# Patient Record
Sex: FEMALE | NOT HISPANIC OR LATINO | URBAN - METROPOLITAN AREA
[De-identification: names, ages, dates, MRNs, and addresses within clinical notes are randomized per-mention and may not be internally consistent; named-entity substitution may affect disease eponyms.]

---

## 2021-06-01 ENCOUNTER — WEB ENCOUNTER (OUTPATIENT)
Dept: URBAN - METROPOLITAN AREA CLINIC 94 | Facility: CLINIC | Age: 76
End: 2021-06-01

## 2021-06-01 ENCOUNTER — OFFICE VISIT (OUTPATIENT)
Dept: URBAN - METROPOLITAN AREA CLINIC 94 | Facility: CLINIC | Age: 76
End: 2021-06-01
Payer: MEDICARE

## 2021-06-01 VITALS
HEIGHT: 66 IN | HEART RATE: 159 BPM | DIASTOLIC BLOOD PRESSURE: 98 MMHG | BODY MASS INDEX: 25.07 KG/M2 | TEMPERATURE: 97.3 F | SYSTOLIC BLOOD PRESSURE: 154 MMHG | WEIGHT: 156 LBS

## 2021-06-01 DIAGNOSIS — R14.2 BELCHING: ICD-10-CM

## 2021-06-01 DIAGNOSIS — R63.4 WEIGHT LOSS: ICD-10-CM

## 2021-06-01 DIAGNOSIS — F45.8 AEROPHAGIA: ICD-10-CM

## 2021-06-01 DIAGNOSIS — A04.8 H. PYLORI INFECTION: ICD-10-CM

## 2021-06-01 PROCEDURE — 99204 OFFICE O/P NEW MOD 45 MIN: CPT | Performed by: INTERNAL MEDICINE

## 2021-06-01 RX ORDER — METRONIDAZOLE 500 MG/1
1 TABLET TABLET ORAL THREE TIMES A DAY
Status: ACTIVE | COMMUNITY

## 2021-06-01 RX ORDER — AMOXICILLIN 500 MG/1
1 CAPSULE CAPSULE ORAL
Status: ACTIVE | COMMUNITY

## 2021-06-01 RX ORDER — CLARITHROMYCIN 500 MG/1
1 TABLET TABLET, FILM COATED ORAL
Status: ACTIVE | COMMUNITY

## 2021-06-01 RX ORDER — OMEPRAZOLE 40 MG/1
1 CAPSULE 30 MINUTES BEFORE MORNING MEAL CAPSULE, DELAYED RELEASE ORAL ONCE A DAY
Qty: 60 | Refills: 2 | OUTPATIENT
Start: 2021-06-01

## 2021-06-01 RX ORDER — PANTOPRAZOLE SODIUM 40 MG/1
1 TABLET TABLET, DELAYED RELEASE ORAL ONCE A DAY
Status: ACTIVE | COMMUNITY

## 2021-06-01 RX ORDER — SIMETHICONE 80 MG/1
1 TABLET AFTER MEALS AND AT BEDTIME AS NEEDED TABLET, CHEWABLE ORAL
Qty: 120 | Refills: 3 | OUTPATIENT
Start: 2021-06-01 | End: 2021-09-29

## 2021-06-01 NOTE — HPI-TODAY'S VISIT:
76 y/o F with anxiety here for belching, weight loss here for 2nd opinion  Patient went to [facility] in April for difficulty swallowing and had EGD which showed positive H.pylori, chronic gastritis and erosive esophagitis. Seen by Dr Prather as follow-up for lack of saliva and persistent belching.  Barium esophagram with smooth distal GEJ narrowing, for which EGD 04/14/2021 with him with mild distal GEJ stricture s/p dilation, with no EoE on biopsies. Saw S&S this May for dysphagia/belching with an MBSS 04/29 completely normal, and they felt her symptoms are significalty anxiety related  Today, patient confirms above history. Has had persistent belching throughout the day, and feels like she cannot eat or drink foods/liquds because of which she's lost weight, other than chicken. Though denies any dysphagia anymore, and no symptoms while sleeping. She has been given quadruple therapy(PPI, clarithro/flagyl,bismuth) however only takes a tablet occasionally since feels it's too much.   Barium esophagram 04/12/21 IMPRESSION: Mild presbyesophagus demonstrated. Smooth distal esophageal narrowing is present, but 13 mm tablet did pass.  There was reflux seen to the thoracic inlet.   EGD 04/14/21 Mild distal esophageal stricture at GE junction s/p Savary dilation to 57F, gastric erythema, duodenal bulb moderate erythema. Biopsies with gastritis, duodentiis. No EoE, H.pylori

## 2021-06-16 ENCOUNTER — TELEPHONE ENCOUNTER (OUTPATIENT)
Dept: URBAN - METROPOLITAN AREA CLINIC 94 | Facility: CLINIC | Age: 76
End: 2021-06-16

## 2021-06-21 ENCOUNTER — TELEPHONE ENCOUNTER (OUTPATIENT)
Dept: URBAN - METROPOLITAN AREA CLINIC 94 | Facility: CLINIC | Age: 76
End: 2021-06-21

## 2021-06-21 RX ORDER — SIMETHICONE 80 MG/1
1 TABLET AFTER MEALS AND AT BEDTIME AS NEEDED TABLET, CHEWABLE ORAL
Qty: 120 | Refills: 3 | Status: ACTIVE | COMMUNITY
Start: 2021-06-01 | End: 2021-09-29

## 2021-06-21 RX ORDER — OMEPRAZOLE 40 MG/1
1 CAPSULE 30 MINUTES BEFORE MORNING MEAL CAPSULE, DELAYED RELEASE ORAL ONCE A DAY
Qty: 60 | Refills: 2 | Status: ACTIVE | COMMUNITY
Start: 2021-06-01

## 2021-06-21 RX ORDER — AMOXICILLIN 500 MG/1
1 CAPSULE CAPSULE ORAL
Status: ACTIVE | COMMUNITY

## 2021-06-21 RX ORDER — CLARITHROMYCIN 500 MG/1
1 TABLET TABLET, FILM COATED ORAL
Status: ACTIVE | COMMUNITY

## 2021-06-21 RX ORDER — PANTOPRAZOLE SODIUM 40 MG/1
1 TABLET TABLET, DELAYED RELEASE ORAL ONCE A DAY
Status: ACTIVE | COMMUNITY

## 2021-06-21 RX ORDER — DICYCLOMINE HYDROCHLORIDE 20 MG/1
1 TABLET TABLET ORAL
Qty: 120 | Refills: 3 | OUTPATIENT
Start: 2021-06-21 | End: 2021-10-19

## 2021-06-21 RX ORDER — METRONIDAZOLE 500 MG/1
1 TABLET TABLET ORAL THREE TIMES A DAY
Status: ACTIVE | COMMUNITY

## 2021-06-23 ENCOUNTER — TELEPHONE ENCOUNTER (OUTPATIENT)
Dept: URBAN - METROPOLITAN AREA CLINIC 94 | Facility: CLINIC | Age: 76
End: 2021-06-23

## 2021-06-29 ENCOUNTER — LAB OUTSIDE AN ENCOUNTER (OUTPATIENT)
Dept: URBAN - METROPOLITAN AREA CLINIC 94 | Facility: CLINIC | Age: 76
End: 2021-06-29

## 2021-06-30 ENCOUNTER — TELEPHONE ENCOUNTER (OUTPATIENT)
Dept: URBAN - METROPOLITAN AREA CLINIC 94 | Facility: CLINIC | Age: 76
End: 2021-06-30

## 2021-06-30 RX ORDER — PANTOPRAZOLE SODIUM 40 MG/1
1 TABLET TABLET, DELAYED RELEASE ORAL ONCE A DAY
Status: ACTIVE | COMMUNITY

## 2021-06-30 RX ORDER — SIMETHICONE 80 MG/1
1 TABLET AFTER MEALS AND AT BEDTIME AS NEEDED TABLET, CHEWABLE ORAL
Qty: 120 | Refills: 3 | Status: ACTIVE | COMMUNITY
Start: 2021-06-01 | End: 2021-09-29

## 2021-06-30 RX ORDER — OMEPRAZOLE 40 MG/1
1 CAPSULE 30 MINUTES BEFORE MORNING MEAL CAPSULE, DELAYED RELEASE ORAL ONCE A DAY
Qty: 60 | Refills: 2 | Status: ACTIVE | COMMUNITY
Start: 2021-06-01

## 2021-06-30 RX ORDER — METRONIDAZOLE 500 MG/1
1 TABLET TABLET ORAL THREE TIMES A DAY
Status: ACTIVE | COMMUNITY

## 2021-06-30 RX ORDER — CLARITHROMYCIN 500 MG/1
1 TABLET TABLET, FILM COATED ORAL
Status: ACTIVE | COMMUNITY

## 2021-06-30 RX ORDER — HYOSCYAMINE SULFATE 0.12 MG/1
1 TABLET AS NEEDED TABLET ORAL
Qty: 120 | Refills: 2 | OUTPATIENT
Start: 2021-07-01 | End: 2021-09-29

## 2021-06-30 RX ORDER — DICYCLOMINE HYDROCHLORIDE 20 MG/1
1 TABLET TABLET ORAL
Qty: 120 | Refills: 3 | Status: ACTIVE | COMMUNITY
Start: 2021-06-21 | End: 2021-10-19

## 2021-06-30 RX ORDER — AMOXICILLIN 500 MG/1
1 CAPSULE CAPSULE ORAL
Status: ACTIVE | COMMUNITY

## 2021-06-30 RX ORDER — DICYCLOMINE HYDROCHLORIDE 20 MG/1
1 TABLET TABLET ORAL
OUTPATIENT
Start: 2021-06-21 | End: 2021-10-19

## 2021-07-05 LAB
CREATININE POC: 1.2
PERFORMING LAB: (no result)

## 2021-07-08 ENCOUNTER — TELEPHONE ENCOUNTER (OUTPATIENT)
Dept: URBAN - METROPOLITAN AREA CLINIC 94 | Facility: CLINIC | Age: 76
End: 2021-07-08

## 2021-07-21 ENCOUNTER — OFFICE VISIT (OUTPATIENT)
Dept: URBAN - METROPOLITAN AREA CLINIC 94 | Facility: CLINIC | Age: 76
End: 2021-07-21
Payer: MEDICARE

## 2021-07-21 VITALS
HEIGHT: 66 IN | WEIGHT: 151 LBS | TEMPERATURE: 97.3 F | BODY MASS INDEX: 24.27 KG/M2 | HEART RATE: 121 BPM | SYSTOLIC BLOOD PRESSURE: 136 MMHG | DIASTOLIC BLOOD PRESSURE: 88 MMHG

## 2021-07-21 DIAGNOSIS — R14.2 BELCHING: ICD-10-CM

## 2021-07-21 DIAGNOSIS — F45.8 AEROPHAGIA: ICD-10-CM

## 2021-07-21 DIAGNOSIS — A04.8 H. PYLORI INFECTION: ICD-10-CM

## 2021-07-21 PROCEDURE — 99213 OFFICE O/P EST LOW 20 MIN: CPT | Performed by: INTERNAL MEDICINE

## 2021-07-21 RX ORDER — PANTOPRAZOLE SODIUM 40 MG/1
1 TABLET TABLET, DELAYED RELEASE ORAL ONCE A DAY
Status: DISCONTINUED | COMMUNITY

## 2021-07-21 RX ORDER — METRONIDAZOLE 500 MG/1
1 TABLET TABLET ORAL THREE TIMES A DAY
Status: DISCONTINUED | COMMUNITY

## 2021-07-21 RX ORDER — AMOXICILLIN 500 MG/1
1 CAPSULE CAPSULE ORAL
Status: DISCONTINUED | COMMUNITY

## 2021-07-21 RX ORDER — HYOSCYAMINE SULFATE 0.12 MG/1
1 TABLET AS NEEDED TABLET ORAL
Qty: 120 | Refills: 2 | Status: DISCONTINUED | COMMUNITY
Start: 2021-07-01 | End: 2021-09-29

## 2021-07-21 RX ORDER — SIMETHICONE 80 MG/1
1 TABLET AFTER MEALS AND AT BEDTIME AS NEEDED TABLET, CHEWABLE ORAL
Qty: 120 | Refills: 3 | Status: ACTIVE | COMMUNITY
Start: 2021-06-01 | End: 2021-09-29

## 2021-07-21 RX ORDER — CLARITHROMYCIN 500 MG/1
1 TABLET TABLET, FILM COATED ORAL
Status: DISCONTINUED | COMMUNITY

## 2021-07-21 RX ORDER — OMEPRAZOLE 40 MG/1
1 CAPSULE 30 MINUTES BEFORE MORNING MEAL CAPSULE, DELAYED RELEASE ORAL ONCE A DAY
Qty: 60 | Refills: 2 | Status: ACTIVE | COMMUNITY
Start: 2021-06-01

## 2021-07-21 NOTE — HPI-TODAY'S VISIT:
76 y/o F with anxiety here for belching, weight loss initially seen for 2nd opinion and here for f/u   Patient went to [facility] in April for difficulty swallowing and had EGD which showed positive H.pylori, chronic gastritis and erosive esophagitis. Seen by Dr Prather as follow-up for lack of saliva and persistent belching.  Barium esophagram with smooth distal GEJ narrowing, for which EGD 04/14/2021 with him with mild distal GEJ stricture s/p dilation, with no EoE on biopsies and no H.pylori though did not truly take her quadruple therapy other than some intermittent tablets. Saw S&S this May for dysphagia/belching with an MBSS 04/29 completely normal, and they felt her symptoms are significalty anxiety related. Seen by me and continued complaint of persistent belching and unable to eat/drink most thinkgs other than chicken, though denies dysphagia. She had worsening abdominal pain for which underwent CT a/p 06/2021 which was normal other than fibroid and adrenal adeoma  Today reports doing much better. The omeprazole is working. She realized the blurred vision was not from dicyclomine but rather cataract. Dicyclomine also helps  CT a/p 06/2021 Fibroid, 1.5cm adrenal adenoma, otherwise normal   Barium esophagram 04/12/21 IMPRESSION: Mild presbyesophagus demonstrated. Smooth distal esophageal narrowing is present, but 13 mm tablet did pass.  There was reflux seen to the thoracic inlet.   EGD 04/14/21 Mild distal esophageal stricture at GE junction s/p Savary dilation to 57F, gastric erythema, duodenal bulb moderate erythema. Biopsies with gastritis, duodentiis. No EoE, H.pylori

## 2021-07-27 ENCOUNTER — ERX REFILL RESPONSE (OUTPATIENT)
Dept: URBAN - METROPOLITAN AREA CLINIC 94 | Facility: CLINIC | Age: 76
End: 2021-07-27

## 2021-07-27 RX ORDER — OMEPRAZOLE 40 MG/1
TAKE 1 CAPSULE BY MOUTH 30 MINUTES BEFORE MORNING MEAL ONCE A DAY CAPSULE, DELAYED RELEASE ORAL
Qty: 60 CAPSULE | Refills: 3 | OUTPATIENT

## 2021-07-27 RX ORDER — OMEPRAZOLE 40 MG/1
1 CAPSULE 30 MINUTES BEFORE MORNING MEAL CAPSULE, DELAYED RELEASE ORAL ONCE A DAY
Qty: 60 | Refills: 2 | OUTPATIENT

## 2021-07-29 ENCOUNTER — TELEPHONE ENCOUNTER (OUTPATIENT)
Dept: URBAN - METROPOLITAN AREA CLINIC 94 | Facility: CLINIC | Age: 76
End: 2021-07-29

## 2022-01-19 ENCOUNTER — OFFICE VISIT (OUTPATIENT)
Dept: URBAN - METROPOLITAN AREA CLINIC 94 | Facility: CLINIC | Age: 77
End: 2022-01-19
Payer: MEDICARE

## 2022-01-19 VITALS
HEIGHT: 66 IN | SYSTOLIC BLOOD PRESSURE: 143 MMHG | WEIGHT: 163 LBS | HEART RATE: 112 BPM | TEMPERATURE: 97.3 F | BODY MASS INDEX: 26.2 KG/M2 | DIASTOLIC BLOOD PRESSURE: 84 MMHG

## 2022-01-19 DIAGNOSIS — R14.2 BELCHING: ICD-10-CM

## 2022-01-19 DIAGNOSIS — R12 HEARTBURN: ICD-10-CM

## 2022-01-19 PROBLEM — 30693006: Status: ACTIVE | Noted: 2021-06-01

## 2022-01-19 PROCEDURE — 99213 OFFICE O/P EST LOW 20 MIN: CPT | Performed by: INTERNAL MEDICINE

## 2022-01-19 RX ORDER — OMEPRAZOLE 40 MG/1
TAKE 1 CAPSULE BY MOUTH 30 MINUTES BEFORE MORNING MEAL ONCE A DAY CAPSULE, DELAYED RELEASE ORAL
Qty: 60 CAPSULE | Refills: 3 | Status: ACTIVE | COMMUNITY

## 2022-01-19 RX ORDER — DICYCLOMINE HYDROCHLORIDE 20 MG/1
1 TABLET TABLET ORAL THREE TIMES A DAY
Status: ACTIVE | COMMUNITY

## 2022-01-19 NOTE — HPI-TODAY'S VISIT:
76 y/o F with anxiety here for f/u of belching, weight loss   Patient went to [facility] in April 2021 for difficulty swallowing and had EGD which showed positive H.pylori, chronic gastritis and erosive esophagitis. Seen by Dr Prather as follow-up for lack of saliva and persistent belching.  Barium esophagram with smooth distal GEJ narrowing, for which EGD 04/14/2021 with him with mild distal GEJ stricture s/p dilation, with no EoE on biopsies and no H.pylori. Saw S&S May 2021 for dysphagia/belching with an MBSS 04/29 completely normal, and they felt her symptoms are significalty anxiety related. Seen by me and continued complaint of persistent belching and unable to eat/drink most thinkgs other than chicken, though denies dysphagia. She had worsening abdominal pain for which underwent CT a/p 06/2021 which was normal other than fibroid and adrenal adeoma  Today reports her symptoms have resolved. Anxiety is much better  CT a/p 06/2021 Fibroid, 1.5cm adrenal adenoma, otherwise normal   Barium esophagram 04/12/21 IMPRESSION: Mild presbyesophagus demonstrated. Smooth distal esophageal narrowing is present, but 13 mm tablet did pass.  There was reflux seen to the thoracic inlet.   EGD 04/14/21 Mild distal esophageal stricture at GE junction s/p Savary dilation to 57F, gastric erythema, duodenal bulb moderate erythema. Biopsies with gastritis, duodentiis. No EoE, H.pylori

## 2022-01-20 ENCOUNTER — ERX REFILL RESPONSE (OUTPATIENT)
Dept: URBAN - METROPOLITAN AREA CLINIC 94 | Facility: CLINIC | Age: 77
End: 2022-01-20

## 2022-01-20 RX ORDER — OMEPRAZOLE 40 MG/1
TAKE 1 CAPSULE BY MOUTH  30  MINUTES BEFORE MORNING MEAL ONE TIME DAILY CAPSULE, DELAYED RELEASE ORAL
Qty: 90 CAPSULE | Refills: 3 | OUTPATIENT

## 2022-01-20 RX ORDER — OMEPRAZOLE 40 MG/1
TAKE 1 CAPSULE BY MOUTH  30  MINUTES BEFORE MORNING MEAL ONE TIME DAILY CAPSULE, DELAYED RELEASE ORAL
Qty: 90 CAPSULE | Refills: 0 | OUTPATIENT

## 2022-06-17 ENCOUNTER — ERX REFILL RESPONSE (OUTPATIENT)
Dept: URBAN - METROPOLITAN AREA CLINIC 94 | Facility: CLINIC | Age: 77
End: 2022-06-17

## 2022-06-17 RX ORDER — OMEPRAZOLE 40 MG/1
TAKE 1 CAPSULE BY MOUTH  30  MINUTES BEFORE MORNING MEAL ONE TIME DAILY CAPSULE, DELAYED RELEASE ORAL
Qty: 90 CAPSULE | Refills: 3 | OUTPATIENT

## 2022-06-17 RX ORDER — OMEPRAZOLE 40 MG/1
TAKE 1 CAPSULE BY MOUTH  30  MINUTES BEFORE MORNING MEAL ONE TIME DAILY CAPSULE, DELAYED RELEASE ORAL
Qty: 90 CAPSULE | Refills: 1 | OUTPATIENT

## 2022-06-30 ENCOUNTER — OFFICE VISIT (OUTPATIENT)
Dept: URBAN - METROPOLITAN AREA CLINIC 94 | Facility: CLINIC | Age: 77
End: 2022-06-30

## 2022-07-08 ENCOUNTER — OFFICE VISIT (OUTPATIENT)
Dept: URBAN - METROPOLITAN AREA CLINIC 94 | Facility: CLINIC | Age: 77
End: 2022-07-08
Payer: MEDICARE

## 2022-07-08 VITALS
HEART RATE: 85 BPM | HEIGHT: 66 IN | BODY MASS INDEX: 25.88 KG/M2 | SYSTOLIC BLOOD PRESSURE: 135 MMHG | DIASTOLIC BLOOD PRESSURE: 85 MMHG | TEMPERATURE: 97 F | WEIGHT: 161 LBS

## 2022-07-08 DIAGNOSIS — R12 HEARTBURN: ICD-10-CM

## 2022-07-08 DIAGNOSIS — K62.89 RECTAL PAIN: ICD-10-CM

## 2022-07-08 PROCEDURE — 99213 OFFICE O/P EST LOW 20 MIN: CPT | Performed by: INTERNAL MEDICINE

## 2022-07-08 RX ORDER — OMEPRAZOLE 40 MG/1
TAKE 1 CAPSULE BY MOUTH  30  MINUTES BEFORE MORNING MEAL ONE TIME DAILY CAPSULE, DELAYED RELEASE ORAL
Qty: 90 CAPSULE | Refills: 1 | Status: ACTIVE | COMMUNITY

## 2022-07-08 RX ORDER — DICYCLOMINE HYDROCHLORIDE 20 MG/1
1 TABLET TABLET ORAL THREE TIMES A DAY
Status: ACTIVE | COMMUNITY

## 2022-07-08 NOTE — HPI-TODAY'S VISIT:
77 y/o F with anxiety, aerophagia here for rectal pain  Patient today reports for the last 2 weeks has had rectal pain, without itching/burning. Does have occasional abdominal cramping, but that responds to dicyclomine. . Reports last colonoscopy ~5 years ago, but no records Her aerophagia/dysphagia has resolved after treatment of anxiety  Prior Hx Patient went to [facility] in April 2021 for difficulty swallowing and had EGD which showed positive H.pylori, chronic gastritis and erosive esophagitis. Seen by Dr Prather as follow-up for lack of saliva and persistent belching.  Barium esophagram with smooth distal GEJ narrowing, for which EGD 04/14/2021 with him with mild distal GEJ stricture s/p dilation, with no EoE on biopsies and no H.pylori. Saw S&S May 2021 for dysphagia/belching with an MBSS 04/29 completely normal, and they felt her symptoms are significalty anxiety related. Seen by me and continued complaint of persistent belching and unable to eat/drink most thinkgs other than chicken, though denies dysphagia. She had worsening abdominal pain for which underwent CT a/p 06/2021 which was normal other than fibroid and adrenal adeoma. Her symptoms all resolved with treatment of anxiety  CT a/p 06/2021 Fibroid, 1.5cm adrenal adenoma, otherwise normal  Barium esophagram 04/12/21 IMPRESSION: Mild presbyesophagus demonstrated. Smooth distal esophageal narrowing is present, but 13 mm tablet did pass.  There was reflux seen to the thoracic inlet.   EGD 04/14/21 Mild distal esophageal stricture at GE junction s/p Savary dilation to 57F, gastric erythema, duodenal bulb moderate erythema. Biopsies with gastritis, duodentiis. No EoE, H.pylori

## 2022-07-19 ENCOUNTER — ERX REFILL RESPONSE (OUTPATIENT)
Dept: URBAN - METROPOLITAN AREA CLINIC 94 | Facility: CLINIC | Age: 77
End: 2022-07-19

## 2022-07-19 RX ORDER — DICYCLOMINE HYDROCHLORIDE 20 MG/1
TAKE 1 TABLET 30 MINUTES BEFORE MEALS AND AT BEDTIME AS NEEDED TABLET ORAL
Qty: 120 TABLET | Refills: 1 | OUTPATIENT

## 2022-07-19 RX ORDER — DICYCLOMINE HYDROCHLORIDE 20 MG/1
TAKE 1 TABLET 30 MINUTES BEFORE MEALS AND AT BEDTIME AS NEEDED TABLET ORAL
Qty: 120 TABLET | Refills: 0 | OUTPATIENT

## 2022-08-08 ENCOUNTER — OFFICE VISIT (OUTPATIENT)
Dept: URBAN - METROPOLITAN AREA SURGERY CENTER 17 | Facility: SURGERY CENTER | Age: 77
End: 2022-08-08

## 2022-08-15 ENCOUNTER — CLAIMS CREATED FROM THE CLAIM WINDOW (OUTPATIENT)
Dept: URBAN - METROPOLITAN AREA CLINIC 4 | Facility: CLINIC | Age: 77
End: 2022-08-15
Payer: MEDICARE

## 2022-08-15 ENCOUNTER — OFFICE VISIT (OUTPATIENT)
Dept: URBAN - METROPOLITAN AREA SURGERY CENTER 17 | Facility: SURGERY CENTER | Age: 77
End: 2022-08-15
Payer: MEDICARE

## 2022-08-15 DIAGNOSIS — D12.4 ADENOMA OF DESCENDING COLON: ICD-10-CM

## 2022-08-15 DIAGNOSIS — D12.4 BENIGN NEOPLASM OF DESCENDING COLON: ICD-10-CM

## 2022-08-15 DIAGNOSIS — K62.5 ANAL BLEEDING: ICD-10-CM

## 2022-08-15 PROCEDURE — 88305 TISSUE EXAM BY PATHOLOGIST: CPT | Performed by: PATHOLOGY

## 2022-08-15 PROCEDURE — 45385 COLONOSCOPY W/LESION REMOVAL: CPT | Performed by: INTERNAL MEDICINE

## 2022-08-15 PROCEDURE — G8907 PT DOC NO EVENTS ON DISCHARG: HCPCS | Performed by: INTERNAL MEDICINE

## 2022-08-15 RX ORDER — OMEPRAZOLE 40 MG/1
TAKE 1 CAPSULE BY MOUTH  30  MINUTES BEFORE MORNING MEAL ONE TIME DAILY CAPSULE, DELAYED RELEASE ORAL
Qty: 90 CAPSULE | Refills: 1 | Status: ACTIVE | COMMUNITY

## 2022-08-15 RX ORDER — DICYCLOMINE HYDROCHLORIDE 20 MG/1
TAKE 1 TABLET 30 MINUTES BEFORE MEALS AND AT BEDTIME AS NEEDED TABLET ORAL
Qty: 120 TABLET | Refills: 1 | Status: ACTIVE | COMMUNITY

## 2022-08-30 ENCOUNTER — OFFICE VISIT (OUTPATIENT)
Dept: URBAN - METROPOLITAN AREA CLINIC 94 | Facility: CLINIC | Age: 77
End: 2022-08-30

## 2022-08-31 ENCOUNTER — OFFICE VISIT (OUTPATIENT)
Dept: URBAN - METROPOLITAN AREA CLINIC 94 | Facility: CLINIC | Age: 77
End: 2022-08-31

## 2022-08-31 RX ORDER — OMEPRAZOLE 40 MG/1
TAKE 1 CAPSULE BY MOUTH  30  MINUTES BEFORE MORNING MEAL ONE TIME DAILY CAPSULE, DELAYED RELEASE ORAL
Qty: 90 CAPSULE | Refills: 1 | Status: ACTIVE | COMMUNITY

## 2022-08-31 RX ORDER — TRIAMTERENE AND HYDROCHLOROTHIAZIDE 37.5; 25 MG/1; MG/1
1 CAPSULE IN THE MORNING CAPSULE ORAL ONCE A DAY
Status: ACTIVE | COMMUNITY

## 2022-08-31 RX ORDER — DICYCLOMINE HYDROCHLORIDE 20 MG/1
1 TABLET TABLET ORAL THREE TIMES A DAY
Status: ACTIVE | COMMUNITY

## 2022-08-31 RX ORDER — METOPROLOL SUCCINATE 25 MG/1
1 TABLET TABLET, EXTENDED RELEASE ORAL ONCE A DAY
Status: ACTIVE | COMMUNITY

## 2022-08-31 RX ORDER — DICYCLOMINE HYDROCHLORIDE 20 MG/1
TAKE 1 TABLET 30 MINUTES BEFORE MEALS AND AT BEDTIME AS NEEDED TABLET ORAL
Qty: 120 TABLET | Refills: 1 | Status: ACTIVE | COMMUNITY

## 2022-08-31 RX ORDER — SIMVASTATIN 10 MG
1 TABLET IN THE EVENING TABLET ORAL ONCE A DAY
Status: ACTIVE | COMMUNITY

## 2022-09-06 ENCOUNTER — OFFICE VISIT (OUTPATIENT)
Dept: URBAN - METROPOLITAN AREA CLINIC 94 | Facility: CLINIC | Age: 77
End: 2022-09-06

## 2022-09-13 ENCOUNTER — OFFICE VISIT (OUTPATIENT)
Dept: URBAN - METROPOLITAN AREA CLINIC 94 | Facility: CLINIC | Age: 77
End: 2022-09-13
Payer: MEDICARE

## 2022-09-13 VITALS
TEMPERATURE: 97.2 F | HEART RATE: 85 BPM | BODY MASS INDEX: 26.52 KG/M2 | DIASTOLIC BLOOD PRESSURE: 79 MMHG | WEIGHT: 165 LBS | HEIGHT: 66 IN | SYSTOLIC BLOOD PRESSURE: 130 MMHG

## 2022-09-13 DIAGNOSIS — K62.89 RECTAL PAIN: ICD-10-CM

## 2022-09-13 DIAGNOSIS — R12 HEARTBURN: ICD-10-CM

## 2022-09-13 DIAGNOSIS — K64.8 INTERNAL HEMORRHOID: ICD-10-CM

## 2022-09-13 PROCEDURE — 99213 OFFICE O/P EST LOW 20 MIN: CPT | Performed by: INTERNAL MEDICINE

## 2022-09-13 RX ORDER — OMEPRAZOLE 40 MG/1
TAKE 1 CAPSULE BY MOUTH  30  MINUTES BEFORE MORNING MEAL ONE TIME DAILY CAPSULE, DELAYED RELEASE ORAL
Qty: 90 CAPSULE | Refills: 1 | Status: ACTIVE | COMMUNITY

## 2022-09-13 RX ORDER — SIMVASTATIN 10 MG
1 TABLET IN THE EVENING TABLET ORAL ONCE A DAY
Status: ACTIVE | COMMUNITY

## 2022-09-13 RX ORDER — TRIAMTERENE AND HYDROCHLOROTHIAZIDE 37.5; 25 MG/1; MG/1
1 CAPSULE IN THE MORNING CAPSULE ORAL ONCE A DAY
Status: ACTIVE | COMMUNITY

## 2022-09-13 RX ORDER — DICYCLOMINE HYDROCHLORIDE 20 MG/1
TAKE 1 TABLET 30 MINUTES BEFORE MEALS AND AT BEDTIME AS NEEDED TABLET ORAL
Qty: 120 TABLET | Refills: 1 | Status: ACTIVE | COMMUNITY

## 2022-09-13 RX ORDER — METOPROLOL SUCCINATE 25 MG/1
1 TABLET TABLET, EXTENDED RELEASE ORAL ONCE A DAY
Status: ACTIVE | COMMUNITY

## 2022-09-13 RX ORDER — HYDROCORTISONE 25 MG/G
1 APPLICATION CREAM TOPICAL TWICE A DAY
Qty: 1 TUBE | Refills: 1 | OUTPATIENT
Start: 2022-09-13 | End: 2022-10-11

## 2022-09-13 RX ORDER — DICYCLOMINE HYDROCHLORIDE 20 MG/1
1 TABLET TABLET ORAL THREE TIMES A DAY
Status: ACTIVE | COMMUNITY

## 2022-09-13 NOTE — HPI-TODAY'S VISIT:
77 y/o F with anxiety, aerophagia here for f/u for rectal pain, now s/p CLS  Patient with recent rectal pain, without itching/burning. Does have occasional abdominal cramping, but that responds to dicyclomine. CLS with IH. No symptoms since the colonoscopy   Her aerophagia/dysphagia has resolved after treatment of anxiety  Prior Hx Patient went to [facility] in April 2021 for difficulty swallowing and had EGD which showed positive H.pylori, chronic gastritis and erosive esophagitis. Seen by Dr Prather as follow-up for lack of saliva and persistent belching.  Barium esophagram with smooth distal GEJ narrowing, for which EGD 04/14/2021 with him with mild distal GEJ stricture s/p dilation, with no EoE on biopsies and no H.pylori. Saw S&S May 2021 for dysphagia/belching with an MBSS 04/29 completely normal, and they felt her symptoms are significalty anxiety related. Seen by me and continued complaint of persistent belching and unable to eat/drink most thinkgs other than chicken, though denied dysphagia. She had worsening abdominal pain for which underwent CT a/p 06/2021 which was normal other than fibroid and adrenal adeoma. Her symptoms all resolved with treatment of anxiety  CLS 08/2022: 6mm DC TA, sigmoid diverticuli, medium IH. Repeat in 5 years CT a/p 06/2021 Fibroid, 1.5cm adrenal adenoma, otherwise normal  Barium esophagram 04/12/21 IMPRESSION: Mild presbyesophagus demonstrated. Smooth distal esophageal narrowing is present, but 13 mm tablet did pass.  There was reflux seen to the thoracic inlet.   EGD 04/14/21 Mild distal esophageal stricture at GE junction s/p Savary dilation to 57F, gastric erythema, duodenal bulb moderate erythema. Biopsies with gastritis, duodentiis. No EoE, H.pylori

## 2022-11-08 ENCOUNTER — OFFICE VISIT (OUTPATIENT)
Dept: URBAN - METROPOLITAN AREA CLINIC 94 | Facility: CLINIC | Age: 77
End: 2022-11-08

## 2023-01-20 ENCOUNTER — OFFICE VISIT (OUTPATIENT)
Dept: URBAN - METROPOLITAN AREA CLINIC 94 | Facility: CLINIC | Age: 78
End: 2023-01-20

## 2023-01-25 ENCOUNTER — WEB ENCOUNTER (OUTPATIENT)
Dept: URBAN - METROPOLITAN AREA CLINIC 94 | Facility: CLINIC | Age: 78
End: 2023-01-25

## 2023-01-25 ENCOUNTER — OFFICE VISIT (OUTPATIENT)
Dept: URBAN - METROPOLITAN AREA CLINIC 94 | Facility: CLINIC | Age: 78
End: 2023-01-25
Payer: MEDICARE

## 2023-01-25 VITALS
HEIGHT: 66 IN | DIASTOLIC BLOOD PRESSURE: 80 MMHG | WEIGHT: 163 LBS | BODY MASS INDEX: 26.2 KG/M2 | SYSTOLIC BLOOD PRESSURE: 144 MMHG | TEMPERATURE: 97 F | HEART RATE: 88 BPM

## 2023-01-25 DIAGNOSIS — K64.8 INTERNAL HEMORRHOID: ICD-10-CM

## 2023-01-25 DIAGNOSIS — R10.9 ABDOMINAL PAIN, UNSPECIFIED ABDOMINAL LOCATION: ICD-10-CM

## 2023-01-25 DIAGNOSIS — K62.89 RECTAL PAIN: ICD-10-CM

## 2023-01-25 PROCEDURE — 99213 OFFICE O/P EST LOW 20 MIN: CPT | Performed by: INTERNAL MEDICINE

## 2023-01-25 RX ORDER — TRIAMTERENE AND HYDROCHLOROTHIAZIDE 37.5; 25 MG/1; MG/1
1 CAPSULE IN THE MORNING CAPSULE ORAL ONCE A DAY
Status: ACTIVE | COMMUNITY

## 2023-01-25 RX ORDER — METOPROLOL SUCCINATE 25 MG/1
1 TABLET TABLET, EXTENDED RELEASE ORAL ONCE A DAY
Status: ACTIVE | COMMUNITY

## 2023-01-25 RX ORDER — SIMVASTATIN 10 MG
1 TABLET IN THE EVENING TABLET ORAL ONCE A DAY
Status: ACTIVE | COMMUNITY

## 2023-01-25 RX ORDER — DICYCLOMINE HYDROCHLORIDE 20 MG/1
TAKE 1 TABLET 30 MINUTES BEFORE MEALS AND AT BEDTIME AS NEEDED TABLET ORAL
Qty: 120 TABLET | Refills: 1 | Status: ACTIVE | COMMUNITY

## 2023-01-25 RX ORDER — OMEPRAZOLE 40 MG/1
TAKE 1 CAPSULE BY MOUTH  30  MINUTES BEFORE MORNING MEAL ONE TIME DAILY CAPSULE, DELAYED RELEASE ORAL
Qty: 90 CAPSULE | Refills: 1 | Status: ACTIVE | COMMUNITY

## 2023-01-25 RX ORDER — DICYCLOMINE HYDROCHLORIDE 20 MG/1
1 TABLET TABLET ORAL THREE TIMES A DAY
Status: ACTIVE | COMMUNITY

## 2023-01-25 NOTE — HPI-TODAY'S VISIT:
75 y/o F with hx of fibroids, anxiety, aerophagia, rectal pain here for f/u   Patient reports doing well. Has some occasional abdominal pain which responds to dicyclomine. Has had intermittent pelvic discomfort for which went to ER where ultrasound pelvis with known fibroids otherwise normal.  Prior rectal pain, aerophagia/dysphagia has all resolved after treatment of anxiety  Prior Hx Patient went to [facility] in April 2021 for difficulty swallowing and had EGD which showed positive H.pylori, chronic gastritis and erosive esophagitis. Seen by Dr Prather as follow-up for lack of saliva and persistent belching.  Barium esophagram with smooth distal GEJ narrowing, for which EGD 04/14/2021 with him with mild distal GEJ stricture s/p dilation, with no EoE on biopsies and no H.pylori. Saw S&S May 2021 for dysphagia/belching with an MBSS 04/29 completely normal, and they felt her symptoms are significalty anxiety related. Seen by me and continued complaint of persistent belching and unable to eat/drink most thinkgs other than chicken, though denied dysphagia. She had worsening abdominal pain for which underwent CT a/p 06/2021 which was normal other than fibroid and adrenal adeoma. Her symptoms all resolved with treatment of anxiety  CLS 08/2022: 6mm DC TA, sigmoid diverticuli, medium IH. Repeat in 5 years CT a/p 06/2021: Fibroid, 1.5cm adrenal adenoma, otherwise normal  Barium esophagram 04/21: Mild presbyesophagus. Smooth distal esophageal narrowing is present, but 13 mm tablet did pass.  There was reflux seen to the thoracic inlet.   EGD 04/21: Mild distal esophageal stricture at GE junction s/p Savary dilation to 57F, gastric erythema, duodenal bulb moderate erythema. Biopsies with gastritis, duodentiis. No EoE, H.pylori

## 2023-03-14 ENCOUNTER — OFFICE VISIT (OUTPATIENT)
Dept: URBAN - METROPOLITAN AREA CLINIC 94 | Facility: CLINIC | Age: 78
End: 2023-03-14

## 2023-03-22 ENCOUNTER — OFFICE VISIT (OUTPATIENT)
Dept: URBAN - METROPOLITAN AREA CLINIC 94 | Facility: CLINIC | Age: 78
End: 2023-03-22
Payer: MEDICARE

## 2023-03-22 VITALS
DIASTOLIC BLOOD PRESSURE: 88 MMHG | SYSTOLIC BLOOD PRESSURE: 150 MMHG | WEIGHT: 165 LBS | HEART RATE: 84 BPM | BODY MASS INDEX: 26.52 KG/M2 | TEMPERATURE: 97.5 F | HEIGHT: 66 IN

## 2023-03-22 DIAGNOSIS — R10.9 ABDOMINAL PAIN, UNSPECIFIED ABDOMINAL LOCATION: ICD-10-CM

## 2023-03-22 DIAGNOSIS — K59.00 CONSTIPATION, UNSPECIFIED CONSTIPATION TYPE: ICD-10-CM

## 2023-03-22 DIAGNOSIS — K64.8 INTERNAL HEMORRHOID: ICD-10-CM

## 2023-03-22 PROBLEM — 14760008: Status: ACTIVE | Noted: 2023-03-22

## 2023-03-22 PROCEDURE — 99213 OFFICE O/P EST LOW 20 MIN: CPT | Performed by: INTERNAL MEDICINE

## 2023-03-22 RX ORDER — DICYCLOMINE HYDROCHLORIDE 20 MG/1
TAKE 1 TABLET 30 MINUTES BEFORE MEALS AND AT BEDTIME AS NEEDED TABLET ORAL
Qty: 120 TABLET | Refills: 1 | Status: ACTIVE | COMMUNITY

## 2023-03-22 RX ORDER — NAPROXEN 500 MG/1
1 TABLET WITH FOOD OR MILK AS NEEDED TABLET ORAL
Status: ACTIVE | COMMUNITY

## 2023-03-22 RX ORDER — METOPROLOL SUCCINATE 25 MG/1
1 TABLET TABLET, EXTENDED RELEASE ORAL ONCE A DAY
Status: ACTIVE | COMMUNITY

## 2023-03-22 RX ORDER — TRIAMTERENE AND HYDROCHLOROTHIAZIDE 37.5; 25 MG/1; MG/1
1 CAPSULE IN THE MORNING CAPSULE ORAL ONCE A DAY
Status: ACTIVE | COMMUNITY

## 2023-03-22 RX ORDER — DICYCLOMINE HYDROCHLORIDE 20 MG/1
1 TABLET TABLET ORAL THREE TIMES A DAY
Status: ACTIVE | COMMUNITY

## 2023-03-22 RX ORDER — OMEPRAZOLE 40 MG/1
TAKE 1 CAPSULE BY MOUTH  30  MINUTES BEFORE MORNING MEAL ONE TIME DAILY CAPSULE, DELAYED RELEASE ORAL
Qty: 90 CAPSULE | Refills: 1 | Status: ACTIVE | COMMUNITY

## 2023-03-22 RX ORDER — SIMVASTATIN 10 MG
1 TABLET IN THE EVENING TABLET ORAL ONCE A DAY
Status: ACTIVE | COMMUNITY

## 2023-03-22 RX ORDER — HYDROCORTISONE 25 MG/G
1 APPLICATION CREAM TOPICAL TWICE A DAY
Qty: 1 TUBE | Refills: 1 | OUTPATIENT
Start: 2023-03-22 | End: 2023-04-11

## 2023-03-22 NOTE — HPI-TODAY'S VISIT:
76 y/o F with hx of fibroids, anxiety, aerophagia, rectal pain here for f/u   Patient reports doing well. Has some constipation, not on any meds. Would like a refill for hydrocortisone Prior rectal pain, aerophagia/dysphagia has all resolved after treatment of anxiety  Prior Hx Patient went to [facility] in April 2021 for difficulty swallowing and had EGD which showed positive H.pylori, chronic gastritis and erosive esophagitis. Seen by Dr Prather as follow-up for lack of saliva and persistent belching.  Barium esophagram with smooth distal GEJ narrowing, for which EGD 04/14/2021 with him with mild distal GEJ stricture s/p dilation, with no EoE on biopsies and no H.pylori. Saw S&S May 2021 for dysphagia/belching with an MBSS 04/29 completely normal, and they felt her symptoms are significalty anxiety related. Seen by me and continued complaint of persistent belching and unable to eat/drink most thinkgs other than chicken, though denied dysphagia. She had worsening abdominal pain for which underwent CT a/p 06/2021 which was normal other than fibroid and adrenal adeoma. Her symptoms all resolved with treatment of anxiety  CLS 08/2022: 6mm DC TA, sigmoid diverticuli, medium IH. Repeat in 5 years CT a/p 06/2021: Fibroid, 1.5cm adrenal adenoma, otherwise normal  Barium esophagram 04/21: Mild presbyesophagus. Smooth distal esophageal narrowing is present, but 13 mm tablet did pass.  There was reflux seen to the thoracic inlet.   EGD 04/21: Mild distal esophageal stricture at GE junction s/p Savary dilation to 57F, gastric erythema, duodenal bulb moderate erythema. Biopsies with gastritis, duodentiis. No EoE, H.pylori

## 2023-07-11 ENCOUNTER — OFFICE VISIT (OUTPATIENT)
Dept: URBAN - METROPOLITAN AREA CLINIC 94 | Facility: CLINIC | Age: 78
End: 2023-07-11

## 2023-08-18 ENCOUNTER — OFFICE VISIT (OUTPATIENT)
Dept: URBAN - METROPOLITAN AREA CLINIC 94 | Facility: CLINIC | Age: 78
End: 2023-08-18
Payer: MEDICARE

## 2023-08-18 VITALS
BODY MASS INDEX: 26.03 KG/M2 | WEIGHT: 162 LBS | DIASTOLIC BLOOD PRESSURE: 89 MMHG | HEIGHT: 66 IN | SYSTOLIC BLOOD PRESSURE: 143 MMHG | HEART RATE: 90 BPM | TEMPERATURE: 97.2 F

## 2023-08-18 DIAGNOSIS — K58.8 OTHER IRRITABLE BOWEL SYNDROME: ICD-10-CM

## 2023-08-18 PROBLEM — 10743008: Status: ACTIVE | Noted: 2023-08-18

## 2023-08-18 PROCEDURE — 99213 OFFICE O/P EST LOW 20 MIN: CPT | Performed by: INTERNAL MEDICINE

## 2023-08-18 RX ORDER — SIMVASTATIN 10 MG
1 TABLET IN THE EVENING TABLET ORAL ONCE A DAY
Status: ACTIVE | COMMUNITY

## 2023-08-18 RX ORDER — OMEPRAZOLE 40 MG/1
TAKE 1 CAPSULE BY MOUTH  30  MINUTES BEFORE MORNING MEAL ONE TIME DAILY CAPSULE, DELAYED RELEASE ORAL
Qty: 90 CAPSULE | Refills: 1 | Status: ACTIVE | COMMUNITY

## 2023-08-18 RX ORDER — TRIAMTERENE AND HYDROCHLOROTHIAZIDE 37.5; 25 MG/1; MG/1
1 CAPSULE IN THE MORNING CAPSULE ORAL ONCE A DAY
Status: ACTIVE | COMMUNITY

## 2023-08-18 RX ORDER — DICYCLOMINE HYDROCHLORIDE 20 MG/1
1 TABLET TABLET ORAL THREE TIMES A DAY
Status: ACTIVE | COMMUNITY

## 2023-08-18 NOTE — HPI-TODAY'S VISIT:
78 y/o F with hx of fibroids, anxiety, aerophagia, rectal pain here for f/u   Patient reports doing well. Had a ultrasound pelvis done for some pelvic pain(fibroids and 7mm endometrial fundus lining) Prior rectal pain, aerophagia/dysphagia has all resolved after treatment of anxiety  Prior Hx Patient went to [facility] in April 2021 for difficulty swallowing and had EGD which showed positive H.pylori, chronic gastritis and erosive esophagitis. Seen by Dr Prather as follow-up for lack of saliva and persistent belching.  Barium esophagram with smooth distal GEJ narrowing, for which EGD 04/14/2021 with him with mild distal GEJ stricture s/p dilation, with no EoE on biopsies and no H.pylori. Saw S&S May 2021 for dysphagia/belching with an MBSS 04/29 completely normal, and they felt her symptoms are significalty anxiety related. Seen by me and continued complaint of persistent belching and unable to eat/drink most thinkgs other than chicken, though denied dysphagia. She had worsening abdominal pain for which underwent CT a/p 06/2021 which was normal other than fibroid and adrenal adeoma. Her symptoms all resolved with treatment of anxiety  CLS 08/2022: 6mm DC TA, sigmoid diverticuli, medium IH. Repeat in 5 years CT a/p 06/2021: Fibroid, 1.5cm adrenal adenoma, otherwise normal  Barium esophagram 04/21: Mild presbyesophagus. Smooth distal esophageal narrowing is present, but 13 mm tablet did pass.  There was reflux seen to the thoracic inlet.   EGD 04/21: Mild distal esophageal stricture at GE junction s/p Savary dilation to 57F, gastric erythema, duodenal bulb moderate erythema. Biopsies with gastritis, duodentiis. No EoE, H.pylori

## 2024-02-09 ENCOUNTER — OV EP (OUTPATIENT)
Dept: URBAN - METROPOLITAN AREA CLINIC 94 | Facility: CLINIC | Age: 79
End: 2024-02-09
Payer: MEDICARE

## 2024-02-09 VITALS — HEIGHT: 66 IN | HEART RATE: 91 BPM | WEIGHT: 163 LBS | TEMPERATURE: 97.3 F | BODY MASS INDEX: 26.2 KG/M2

## 2024-02-09 DIAGNOSIS — K58.8 OTHER IRRITABLE BOWEL SYNDROME: ICD-10-CM

## 2024-02-09 PROCEDURE — 99213 OFFICE O/P EST LOW 20 MIN: CPT | Performed by: INTERNAL MEDICINE

## 2024-02-09 RX ORDER — OMEPRAZOLE 40 MG/1
TAKE 1 CAPSULE BY MOUTH  30  MINUTES BEFORE MORNING MEAL ONE TIME DAILY CAPSULE, DELAYED RELEASE ORAL
Qty: 90 CAPSULE | Refills: 1 | Status: ACTIVE | COMMUNITY

## 2024-02-09 RX ORDER — SIMVASTATIN 10 MG
1 TABLET IN THE EVENING TABLET ORAL ONCE A DAY
Status: ACTIVE | COMMUNITY

## 2024-02-09 RX ORDER — TRIAMTERENE AND HYDROCHLOROTHIAZIDE 37.5; 25 MG/1; MG/1
1 CAPSULE IN THE MORNING CAPSULE ORAL ONCE A DAY
Status: ACTIVE | COMMUNITY

## 2024-02-09 RX ORDER — DICYCLOMINE HYDROCHLORIDE 20 MG/1
1 TABLET TABLET ORAL THREE TIMES A DAY
Status: ACTIVE | COMMUNITY

## 2024-02-09 NOTE — HPI-TODAY'S VISIT:
77 y/o F with hx of fibroids, anxiety, aerophagia, rectal pain here for f/u   Patient doing well today. Prior symptoms of IBS, aerophagia/dysphagia have all resolved. No longer has anxiety. Is exercising regularly and that has made a huge difference. Takes omeprazole in AM, dicyclomine QID PRN  Prior Hx Patient went to [facility] in April 2021 for difficulty swallowing and had EGD which showed positive H.pylori, chronic gastritis and erosive esophagitis. Seen by Dr Prather as follow-up for lack of saliva and persistent belching.  Barium esophagram with smooth distal GEJ narrowing, for which EGD 04/14/2021 with him with mild distal GEJ stricture s/p dilation, with no EoE on biopsies and no H.pylori. Saw S&S May 2021 for dysphagia/belching with an MBSS 04/29 completely normal, and they felt her symptoms are significalty anxiety related. Seen by me and continued complaint of persistent belching and unable to eat/drink most thinkgs other than chicken, though denied dysphagia. She had worsening abdominal pain for which underwent CT a/p 06/2021 which was normal other than fibroid and adrenal adeoma. Her symptoms all resolved with treatment of anxiety  CLS 08/2022: 6mm DC TA, sigmoid diverticuli, medium IH. Repeat in 5 years CT a/p 06/2021: Fibroid, 1.5cm adrenal adenoma, otherwise normal Barium esophagram 04/2021: Mild presbyesophagus. Smooth distal esophageal narrowing is present, but 13 mm tablet did pass.  There was reflux seen to the thoracic inlet. EGD 04/2021: Mild distal esophageal stricture at GE junction s/p Savary dilation to 57F, gastric erythema, duodenal bulb moderate erythema. Biopsies with gastritis, duodentiis. No EoE, H.pylori

## 2024-02-13 ENCOUNTER — OV EP (OUTPATIENT)
Dept: URBAN - METROPOLITAN AREA CLINIC 94 | Facility: CLINIC | Age: 79
End: 2024-02-13

## 2024-12-03 ENCOUNTER — TELEPHONE ENCOUNTER (OUTPATIENT)
Dept: URBAN - METROPOLITAN AREA CLINIC 6 | Facility: CLINIC | Age: 79
End: 2024-12-03

## 2024-12-03 RX ORDER — DICYCLOMINE HYDROCHLORIDE 20 MG/1
1 TABLET TABLET ORAL THREE TIMES A DAY
Qty: 90 | Refills: 2

## 2025-02-11 ENCOUNTER — OFFICE VISIT (OUTPATIENT)
Dept: URBAN - METROPOLITAN AREA CLINIC 94 | Facility: CLINIC | Age: 80
End: 2025-02-11
Payer: MEDICARE

## 2025-02-11 ENCOUNTER — DASHBOARD ENCOUNTERS (OUTPATIENT)
Age: 80
End: 2025-02-11

## 2025-02-11 VITALS
HEIGHT: 66 IN | BODY MASS INDEX: 28.93 KG/M2 | SYSTOLIC BLOOD PRESSURE: 143 MMHG | DIASTOLIC BLOOD PRESSURE: 84 MMHG | TEMPERATURE: 98.2 F | HEART RATE: 88 BPM | WEIGHT: 180 LBS

## 2025-02-11 DIAGNOSIS — K58.9 IRRITABLE BOWEL SYNDROME, UNSPECIFIED TYPE: ICD-10-CM

## 2025-02-11 PROCEDURE — 99212 OFFICE O/P EST SF 10 MIN: CPT | Performed by: INTERNAL MEDICINE

## 2025-02-11 RX ORDER — OMEPRAZOLE 40 MG/1
TAKE 1 CAPSULE BY MOUTH  30  MINUTES BEFORE MORNING MEAL ONE TIME DAILY CAPSULE, DELAYED RELEASE ORAL
Qty: 90 CAPSULE | Refills: 1 | Status: ACTIVE | COMMUNITY

## 2025-02-11 RX ORDER — TRIAMTERENE AND HYDROCHLOROTHIAZIDE 37.5; 25 MG/1; MG/1
1 CAPSULE IN THE MORNING CAPSULE ORAL ONCE A DAY
Status: ACTIVE | COMMUNITY

## 2025-02-11 RX ORDER — SIMVASTATIN 10 MG
1 TABLET IN THE EVENING TABLET ORAL ONCE A DAY
Status: ACTIVE | COMMUNITY

## 2025-02-11 RX ORDER — DICYCLOMINE HYDROCHLORIDE 20 MG/1
1 TABLET TABLET ORAL THREE TIMES A DAY
Qty: 90 | Refills: 2 | Status: ACTIVE | COMMUNITY

## 2025-02-11 NOTE — HPI-TODAY'S VISIT:
80 y/o F with hx of fibroids, anxiety, aerophagia, rectal pain here for f/u   Patient doing well today. Prior symptoms of IBS, aerophagia/dysphagia have all resolved. No longer has anxiety. Is exercising regularly and that has made a huge difference. Takes omeprazole in AM, dicyclomine daily  Prior Hx Patient went to [facility] in April 2021 for difficulty swallowing and had EGD which showed positive H.pylori, chronic gastritis and erosive esophagitis. Seen by Dr Prather as follow-up for lack of saliva and persistent belching.  Barium esophagram with smooth distal GEJ narrowing, for which EGD 04/14/2021 with him with mild distal GEJ stricture s/p dilation, with no EoE on biopsies and no H.pylori. Saw S&S May 2021 for dysphagia/belching with an MBSS 04/29 completely normal, and they felt her symptoms are significalty anxiety related. Seen by me and continued complaint of persistent belching and unable to eat/drink most thinkgs other than chicken, though denied dysphagia. She had worsening abdominal pain for which underwent CT a/p 06/2021 which was normal other than fibroid and adrenal adeoma. Her symptoms all resolved with treatment of anxiety  CLS 08/2022: 6mm DC TA, sigmoid diverticuli, medium IH. Repeat in 5 years CT a/p 06/2021: Fibroid, 1.5cm adrenal adenoma, otherwise normal Barium esophagram 04/2021: Mild presbyesophagus. Smooth distal esophageal narrowing is present, but 13 mm tablet did pass.  There was reflux seen to the thoracic inlet. EGD 04/2021: Mild distal esophageal stricture at GE junction s/p Savary dilation to 57F, gastric erythema, duodenal bulb moderate erythema. Biopsies with gastritis, duodentiis. No EoE, H.pylori

## 2025-02-11 NOTE — PHYSICAL EXAM NEUROLOGIC:
oriented to person, place and time , normal sensation , short and long term memory intact Home Suture Removal Text: Patient was provided instructions on removing sutures and will remove their sutures at home.  If they have any questions or difficulties they will call the office.